# Patient Record
(demographics unavailable — no encounter records)

---

## 2024-10-18 NOTE — REASON FOR VISIT
[Follow-Up Visit] : a follow-up visit [FreeTextEntry2] : Lisa is a 12-year-old girl with ADHD and ODD seen for follow-up to monitor her progress and discuss treatment recommendations. [FreeTextEntry4] : No medication (s/p Vyvanse, Guanfacine ER, Adderall XR, Focalin XR and Focalin), MVI, melatonin prn [FreeTextEntry1] : Lisa HARVEY [FreeTextEntry5] : Medical records, report card, school testing

## 2024-10-18 NOTE — REVIEW OF SYSTEMS
[Asthma] : asthma [Irritability] : irritability [Normal] : Endocrine [FreeTextEntry3] : nl ophthalmology evaluation [FreeTextEntry4] : auditory sensitivity, nl audiology evaluation [FreeTextEntry5] : cleared by cardiology [de-identified] : has some extremity pain thought to be growing pains, known to rheumatology, mild scoliosis that is being monitored, had a broken finger, known to ortho [de-identified] : eczema [de-identified] : sickle cell trait [de-identified] : separation anxiety

## 2024-10-18 NOTE — REVIEW OF SYSTEMS
[Asthma] : asthma [Irritability] : irritability [Normal] : Endocrine [FreeTextEntry3] : nl ophthalmology evaluation [FreeTextEntry4] : auditory sensitivity, nl audiology evaluation [FreeTextEntry5] : cleared by cardiology [de-identified] : has some extremity pain thought to be growing pains, known to rheumatology, mild scoliosis that is being monitored, had a broken finger, known to ortho [de-identified] : eczema [de-identified] : sickle cell trait [de-identified] : separation anxiety

## 2024-10-18 NOTE — PHYSICAL EXAM
[Normal] : awake and interactive [de-identified] : bright affect, interactive, engaged in a reciprocal conversation, mild oppositionality noted

## 2024-10-18 NOTE — PLAN
[Rationale for Medication Discussed] : The rationale for treating inattention, distractibility, hyperactivity, or impulsivity with medication was discussed. The desired effects, possible side effects, and need for monitoring response were reviewed. Information about various medication options was provided.  The option of not treating with medication was also discussed. [Medication Deferred] : After discussion with the family, the decision was made to defer consideration of treatment with medication. [Findings (To Date)] : Findings from evaluation (to date) [Clinical Basis] : Clinical basis for current diagnosis and clinical impressions [Differential Diagnosis] : Differential diagnosis [Goals / Benefits] : Goals & potential benefits of treatment with medication, as well as the limitations of pharmacotherapy [Stimulants] : Potential benefits and limitations of treatment with stimulant medication.  Potential adverse events were also reviewed, including insomnia, reduced appetite, change in blood pressure or heart rate, headache, stomachache, slowing of growth, moodiness, and onset of tics [CAM Therapies] : Benefits and limits of CAM therapies [Behavior Modification] : Behavior modification strategies [504] : Entitlements under Section 504 of the Americans with Disabilities Act ("accommodation plans") [Family Questions] : Family's questions were addressed [Sleep] : The importance of sleep and strategies to ensure adequate sleep [FreeTextEntry3] :  Current Recommendations: - Will hold off on ADHD medication for now, can have another trial if increased impairment - Continue with counseling/behavior therapy/family therapy since that is the primary treatment for ODD - Continue 504 plan, letter written in the past - Consider trial of saffron 30 mg po daily since there is some limited medical evidence that suggests that it may help with ADHD and mood - Continue melatonin QHS prn to help with sleep onset - Will monitor ADHD symptoms in school through teacher rating scales - Encouragement given about extra-curricular activities - Call prn  - F/U in Spring 2025  Recommendations made at previous visits: - Discussed at length incorporating Gaby-Anali in problem solving - Discussed that traditional parenting strategies, token economies, etc. are likely not going to be effective with Gaby-Anali - Discussed parenting strategies including acknowledgement of feelings, incorporating Gaby-Anali into problem solving, eliminating negative reinforcement since it is not effective , etc.  - Continue fish oil, counseled on daily use at a previous visit - Continue MARILYN therapy for now - Letter written for IEP at a previous visit -The Explosive Child by Oc García PhD - How To Talk So Kids Will Listen & Listen So Kids Will Talk by Nataly Bernabe & Carlota Manjarrez    Billing: Level 5 E/M due to time (40 minutes),  because patient is being followed longitudinally for at least one chronic condition by a single provider

## 2024-10-18 NOTE — HISTORY OF PRESENT ILLNESS
[FreeTextEntry5] : 7th grade 504 plan Counseling Gets private math tutoring [FreeTextEntry1] : Lisa has been doing well overall since the last visit.   She has been off medication.  Lisa is doing well overall in school. She is on target academically. Her report card for 6th grade was mostly 3's and 4's with some 2's in writing. She does not have any behavioral concerns in school. She had a missing assignment in band but is otherwise keeping up with her work. She does a lot of her work during the school day so that she can participate more in extra-curricular activities.   Reading was assessed at the end of 6th grade and was at X (on grade level).   Her mother also notes that she is very talkative at home.   Lisa says that she is paying attention well in school - 8/10 attention span in school.   Her mother says that her behavior at home continues to be challenging. She has selective hearing. She can still be oppositional at home towards caregivers. She can sometimes be disrespectful. She can struggle with following daily routines. She does not do well with constructive criticism.   She was getting MARILYN but this was discontinued since the last visit.   She is doing really well socially and is well-liked by her peers according to her teacher. She can like being in control during social interactions. Her mother has seen improvement in her social responsiveness.   Lisa walks to and from school.   Lisa says that her anxiety and mood are good overall.      [de-identified] : cross country, gymnastics, dance [Surgery] : no surgery [Hospitalizations] : no hospitalizations [FreeTextEntry6] : Has asthma - known to pulmonology Had a finger fracture - s/p ortho, healed well

## 2024-10-18 NOTE — PHYSICAL EXAM
[Normal] : awake and interactive [de-identified] : bright affect, interactive, engaged in a reciprocal conversation, mild oppositionality noted

## 2024-10-18 NOTE — HISTORY OF PRESENT ILLNESS
[FreeTextEntry5] : 7th grade 504 plan Counseling Gets private math tutoring [FreeTextEntry1] : Lisa has been doing well overall since the last visit.   She has been off medication.  Lisa is doing well overall in school. She is on target academically. Her report card for 6th grade was mostly 3's and 4's with some 2's in writing. She does not have any behavioral concerns in school. She had a missing assignment in band but is otherwise keeping up with her work. She does a lot of her work during the school day so that she can participate more in extra-curricular activities.   Reading was assessed at the end of 6th grade and was at X (on grade level).   Her mother also notes that she is very talkative at home.   Lisa says that she is paying attention well in school - 8/10 attention span in school.   Her mother says that her behavior at home continues to be challenging. She has selective hearing. She can still be oppositional at home towards caregivers. She can sometimes be disrespectful. She can struggle with following daily routines. She does not do well with constructive criticism.   She was getting MARILYN but this was discontinued since the last visit.   She is doing really well socially and is well-liked by her peers according to her teacher. She can like being in control during social interactions. Her mother has seen improvement in her social responsiveness.   Lisa walks to and from school.   Lisa says that her anxiety and mood are good overall.      [de-identified] : cross country, gymnastics, dance [Surgery] : no surgery [Hospitalizations] : no hospitalizations [FreeTextEntry6] : Has asthma - known to pulmonology Had a finger fracture - s/p ortho, healed well

## 2025-03-17 NOTE — HISTORY OF PRESENT ILLNESS
[(# ___ in the past year)] : [unfilled] visits to the emergency room in the past year [0 x/month] : 0 x/month [None] : None [< or = 2 days/wk] : < than or = 2 days/week [0 - 1/year] : 0 - 1/year [de-identified] : Augustine Stockton came in for a follow up regarding her asthma. She stated that her asthma is under control and she is currently on Symbicort 80 BID. Her mother stated that she was sick a couple weeks ago but had gotten better. Augustine stated that her only use her albuerol before gym class when needed.

## 2025-03-17 NOTE — HISTORY OF PRESENT ILLNESS
[(# ___ in the past year)] : [unfilled] visits to the emergency room in the past year [0 x/month] : 0 x/month [None] : None [< or = 2 days/wk] : < than or = 2 days/week [0 - 1/year] : 0 - 1/year [de-identified] : Augustine Stockton came in for a follow up regarding her asthma. She stated that her asthma is under control and she is currently on Symbicort 80 BID. Her mother stated that she was sick a couple weeks ago but had gotten better. Augustine stated that her only use her albuerol before gym class when needed.

## 2025-03-17 NOTE — ASSESSMENT
[FreeTextEntry1] : Mild persistent asthma and control. Continue Symbicort 80, 2 puffs twice daily. Albuterol as needed Follow-up in 6 months.

## 2025-03-17 NOTE — IMPRESSION
[Pulmonary Function Test] : Pulmonary Function Test [Normal PFT] : pulmonary function test normal  [FreeTextEntry1] : FEV1 IS 74% OF PRED

## 2025-03-17 NOTE — HISTORY OF PRESENT ILLNESS
[(# ___ in the past year)] : [unfilled] visits to the emergency room in the past year [0 x/month] : 0 x/month [None] : None [< or = 2 days/wk] : < than or = 2 days/week [0 - 1/year] : 0 - 1/year [de-identified] : Augustine Stockton came in for a follow up regarding her asthma. She stated that her asthma is under control and she is currently on Symbicort 80 BID. Her mother stated that she was sick a couple weeks ago but had gotten better. Augustine stated that her only use her albuerol before gym class when needed.

## 2025-03-17 NOTE — HISTORY OF PRESENT ILLNESS
[(# ___ in the past year)] : [unfilled] visits to the emergency room in the past year [0 x/month] : 0 x/month [None] : None [< or = 2 days/wk] : < than or = 2 days/week [0 - 1/year] : 0 - 1/year [de-identified] : Augustine Stockton came in for a follow up regarding her asthma. She stated that her asthma is under control and she is currently on Symbicort 80 BID. Her mother stated that she was sick a couple weeks ago but had gotten better. Augustine stated that her only use her albuerol before gym class when needed.

## 2025-05-09 NOTE — ASSESSMENT
[FreeTextEntry1] : Allergic rhinitis Continue Zyrtec Fluticasone nasal spray- 2 sprays each nostril QD Olopatadine 1 GTT OU BID  Persistent asthma Symbicort 80 2 puffs BID Albuterol Q4 PRN  Atopic Dermatitis- in control at present Mometasone - thin layer BID for 5 days for flare-ups- typically occurring in the summer

## 2025-05-09 NOTE — PHYSICAL EXAM
[Alert] : alert [Healthy Appearance] : healthy appearance [No Acute Distress] : no acute distress [Normal Pupil & Iris Size/Symmetry] : normal pupil and iris size and symmetry [No Discharge] : no discharge [Sclera Not Icteric] : sclera not icteric [Normal TMs] : both tympanic membranes were normal [Normal Lips/Tongue] : the lips and tongue were normal [Normal Outer Ear/Nose] : the ears and nose were normal in appearance [No Nasal Discharge] : no nasal discharge [No Thrush] : no thrush [No LAD] : no lymphadenopathy [Supple] : the neck was supple [Normal Rate and Effort] : normal respiratory rhythm and effort [No Crackles] : no crackles [No Retractions] : no retractions [Bilateral Audible Breath Sounds] : bilateral audible breath sounds [Normal Rate] : heart rate was normal  [Normal S1, S2] : normal S1 and S2 [No murmur] : no murmur [Regular Rhythm] : with a regular rhythm [Skin Intact] : skin intact  [No Rash] : no rash [No Skin Lesions] : no skin lesions [No Cyanosis] : no cyanosis [Normal Mood] : mood was normal [Pale mucosa] : pale mucosa [Boggy Nasal Turbinates] : boggy and/or pale nasal turbinates

## 2025-05-09 NOTE — HISTORY OF PRESENT ILLNESS
[de-identified] : Augustine presents with mom who reports that she has had an outbreak of eczema last week. Mom was treating with Cetaphil and the eczema has calmed down. As per mom, Augustine tends to flare-up with eczema in the summer with the heat. She is requesting to have a refill of Mometasone which has worked well in the past.   Augustine's allergies have also been acting up this spring. Reports multiple sneezing attacks, nasal congestion and itchy eyes. Mom has been giving her Zyrtec. No discharge or crusting of eyelashes.  Known history of persistent asthma, she is currently on Symbicort 80 2 puffs BID. Mom has given her Albuterol a couple of times over the past few weeks for a dry cough. Cough improved afterwards.
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
## 2025-05-19 NOTE — REASON FOR VISIT
[Follow-Up Visit] : a follow-up visit [FreeTextEntry2] : Lisa is a 12-year-old girl with ADHD and ODD seen for follow-up to monitor her progress and discuss treatment recommendations. [FreeTextEntry4] : No medication (s/p Vyvanse, Guanfacine ER, Adderall XR, Focalin XR and Focalin), MVI, melatonin prn [FreeTextEntry1] : Lisa HARVEY [FreeTextEntry5] : Medical records, previous teacher rating scales, self-report rating scales

## 2025-05-19 NOTE — PLAN
[Rationale for Medication Discussed] : The rationale for treating inattention, distractibility, hyperactivity, or impulsivity with medication was discussed. The desired effects, possible side effects, and need for monitoring response were reviewed. Information about various medication options was provided.  The option of not treating with medication was also discussed. [Medication Deferred] : After discussion with the family, the decision was made to defer consideration of treatment with medication. [Findings (To Date)] : Findings from evaluation (to date) [Clinical Basis] : Clinical basis for current diagnosis and clinical impressions [Differential Diagnosis] : Differential diagnosis [Goals / Benefits] : Goals & potential benefits of treatment with medication, as well as the limitations of pharmacotherapy [Stimulants] : Potential benefits and limitations of treatment with stimulant medication.  Potential adverse events were also reviewed, including insomnia, reduced appetite, change in blood pressure or heart rate, headache, stomachache, slowing of growth, moodiness, and onset of tics [CAM Therapies] : Benefits and limits of CAM therapies [Behavior Modification] : Behavior modification strategies [504] : Entitlements under Section 504 of the Americans with Disabilities Act ("accommodation plans") [Family Questions] : Family's questions were addressed [Sleep] : The importance of sleep and strategies to ensure adequate sleep [FreeTextEntry3] :  Current Recommendations: - Rule out absence seizures - Will hold off on ADHD medication for now, can have another trial if increased impairment - Recommended counseling/behavior therapy/family therapy since that is the primary treatment for ODD - How To Talk So Teens Will Listen & Listen So Teens Will Talk by Nataly Manjarrez - Continue 504 plan, letter written  - Consider trial of saffron 30 mg po daily since there is some limited medical evidence that suggests that it may help with ADHD and mood - Continue melatonin QHS prn to help with sleep onset - Will monitor ADHD symptoms in school, advised to send in teacher feedback for review - Encouragement given about extra-curricular activities - Call prn  - F/U in August 2025  Recommendations made at previous visits: - Discussed at length incorporating Lisa in problem solving - Discussed that traditional parenting strategies, token economies, etc. are likely not going to be effective with Gaby-Anali - Discussed parenting strategies including acknowledgement of feelings, incorporating Gaby-Anali into problem solving, eliminating negative reinforcement since it is not effective , etc.  - Continue fish oil, counseled on daily use at a previous visit - Continue MARILYN therapy for now - Letter written for IEP at a previous visit -The Explosive Child by Oc García PhD - How To Talk So Kids Will Listen & Listen So Kids Will Talk by Nataly Manjarrez    Billing: Level 5 E/M due to time (40 minutes),  because patient is being followed longitudinally for at least one chronic condition by a single provider, [id number] x 2 for rating scales

## 2025-05-19 NOTE — REVIEW OF SYSTEMS
[Asthma] : asthma [Irritability] : irritability [Normal] : Endocrine [FreeTextEntry3] : nl ophthalmology evaluation [FreeTextEntry4] : auditory sensitivity, nl audiology evaluation [FreeTextEntry5] : cleared by cardiology [FreeTextEntry6] : known to pulmonology [de-identified] : has some extremity pain thought to be growing pains, known to rheumatology, mild scoliosis that is being monitored, had a broken finger, known to ortho [de-identified] : eczema [de-identified] : sickle cell trait [de-identified] : history of separation anxiety

## 2025-05-19 NOTE — REVIEW OF SYSTEMS
[Asthma] : asthma [Irritability] : irritability [Normal] : Endocrine [FreeTextEntry3] : nl ophthalmology evaluation [FreeTextEntry4] : auditory sensitivity, nl audiology evaluation [FreeTextEntry5] : cleared by cardiology [FreeTextEntry6] : known to pulmonology [de-identified] : has some extremity pain thought to be growing pains, known to rheumatology, mild scoliosis that is being monitored, had a broken finger, known to ortho [de-identified] : eczema [de-identified] : sickle cell trait [de-identified] : history of separation anxiety

## 2025-05-19 NOTE — PHYSICAL EXAM
[Normal] : awake and interactive [de-identified] : bright affect, interactive, engaged in a reciprocal conversation, denied any current challenges

## 2025-05-19 NOTE — PHYSICAL EXAM
[Normal] : awake and interactive [de-identified] : bright affect, interactive, engaged in a reciprocal conversation, denied any current challenges

## 2025-06-03 NOTE — HISTORY OF PRESENT ILLNESS
[FreeTextEntry1] : 11 y/o F with ADHD and ODD presenting for initial evaluation of staring spells.   As per chart review and parent/patient, while at school patient has been noted to have episodes of "daydreaming" and selective hearing at times at home as well. She occasionally stares out into space and not responsive to voice. Denies any generalized or focal motor movements, lip smacking, cyanosis during any of these episodes. Augustine does not recall instances of these episodes. Given these concerns, patient was brought to Neurology for further evaluation.   Birth Hx/Developmental Hx: ex-FT via , no significant hospital course. No concerns for developmental delays requiring therapies, but endorses she should've received for OT given her difficulties with holding pens/pencils/utensils Family Hx: No history of epilepsy, intellectual disability, or autism spectrum disorder Social Hx: Currently 8th grader in [employer], doing well, enjoys Social Studies and Science. Gets bored in class and "zones out" which requires her teachers to bring her back as per the 3rd marking period report card  Medications: vitamins

## 2025-06-03 NOTE — ASSESSMENT
[FreeTextEntry1] : 11 y/o F with ADHD and ODD presenting for initial evaluation of staring spells. Neurologic examination non focal at this time. Episodes of concern are likely inattentiveness given the nature of the presentation, history of ADHD, and recollection at times of these episodes as per Augustine. Given the concerns for staring episodes, will further ascertain an underlying seizure tendency with a routine EEG to rule out generalized spike wave discharges. If normal, RTC PRN

## 2025-06-03 NOTE — HISTORY OF PRESENT ILLNESS
[FreeTextEntry1] : 13 y/o F with ADHD and ODD presenting for initial evaluation of staring spells.   As per chart review and parent/patient, while at school patient has been noted to have episodes of "daydreaming" and selective hearing at times at home as well. She occasionally stares out into space and not responsive to voice. Denies any generalized or focal motor movements, lip smacking, cyanosis during any of these episodes. Augustine does not recall instances of these episodes. Given these concerns, patient was brought to Neurology for further evaluation.   Birth Hx/Developmental Hx: ex-FT via , no significant hospital course. No concerns for developmental delays requiring therapies, but endorses she should've received for OT given her difficulties with holding pens/pencils/utensils Family Hx: No history of epilepsy, intellectual disability, or autism spectrum disorder Social Hx: Currently 8th grader in [employer], doing well, enjoys Social Studies and Science. Gets bored in class and "zones out" which requires her teachers to bring her back as per the 3rd marking period report card  Medications: vitamins

## 2025-06-03 NOTE — CONSULT LETTER
[Dear  ___] : Dear  [unfilled], [Consult Letter:] : I had the pleasure of evaluating your patient, [unfilled]. [( Thank you for referring [unfilled] for consultation for _____ )] : Thank you for referring [unfilled] for consultation for [unfilled] [Please see my note below.] : Please see my note below. [Consult Closing:] : Thank you very much for allowing me to participate in the care of this patient.  If you have any questions, please do not hesitate to contact me. [Sincerely,] : Sincerely, [FreeTextEntry3] : Dr. Smith Giraldo MD Attending Physician Pediatric Neurology and Epilepsy

## 2025-06-03 NOTE — REASON FOR VISIT
[Initial Consultation] : an initial consultation for [Staring Spells] : staring spells [Patient] : patient [Mother] : mother